# Patient Record
Sex: FEMALE | Race: BLACK OR AFRICAN AMERICAN | NOT HISPANIC OR LATINO | Employment: FULL TIME
[De-identification: names, ages, dates, MRNs, and addresses within clinical notes are randomized per-mention and may not be internally consistent; named-entity substitution may affect disease eponyms.]

---

## 2017-07-29 ENCOUNTER — HEALTH MAINTENANCE LETTER (OUTPATIENT)
Age: 42
End: 2017-07-29

## 2019-01-25 ENCOUNTER — NURSE TRIAGE (OUTPATIENT)
Dept: NURSING | Facility: CLINIC | Age: 44
End: 2019-01-25

## 2019-01-26 NOTE — TELEPHONE ENCOUNTER
Patient calling with concerns she took too much medicine. She has had a tooth infection and was seen by dentist today, was started on Amoxicillin and T#3, she has taken each a couple times today and she took ibuprofen and now for the past 30 minutes has significant chest pain and difficulty breathing, feels her heart is racing and it hurts. Sounds like she is in distress. Is home alone. Will call 911.     Reason for Disposition    Sounds like a life-threatening emergency to the triager    Protocols used: CHEST PAIN-ADULT-AH

## 2019-04-12 ENCOUNTER — OFFICE VISIT (OUTPATIENT)
Dept: URGENT CARE | Facility: URGENT CARE | Age: 44
End: 2019-04-12

## 2019-04-12 VITALS
SYSTOLIC BLOOD PRESSURE: 112 MMHG | OXYGEN SATURATION: 97 % | RESPIRATION RATE: 16 BRPM | TEMPERATURE: 98.3 F | HEART RATE: 83 BPM | BODY MASS INDEX: 37.28 KG/M2 | HEIGHT: 66 IN | WEIGHT: 232 LBS | DIASTOLIC BLOOD PRESSURE: 64 MMHG

## 2019-04-12 DIAGNOSIS — R09.A2 GLOBUS SENSATION: Primary | ICD-10-CM

## 2019-04-12 LAB
DEPRECATED S PYO AG THROAT QL EIA: NORMAL
SPECIMEN SOURCE: NORMAL

## 2019-04-12 PROCEDURE — 99213 OFFICE O/P EST LOW 20 MIN: CPT | Performed by: PHYSICIAN ASSISTANT

## 2019-04-12 PROCEDURE — 87880 STREP A ASSAY W/OPTIC: CPT | Performed by: PHYSICIAN ASSISTANT

## 2019-04-12 PROCEDURE — 87081 CULTURE SCREEN ONLY: CPT | Performed by: PHYSICIAN ASSISTANT

## 2019-04-12 ASSESSMENT — MIFFLIN-ST. JEOR: SCORE: 1734.1

## 2019-04-12 ASSESSMENT — PAIN SCALES - GENERAL: PAINLEVEL: SEVERE PAIN (6)

## 2019-04-13 LAB
BACTERIA SPEC CULT: NORMAL
SPECIMEN SOURCE: NORMAL

## 2019-04-13 NOTE — PROGRESS NOTES
CHIEF COMPLAINT:   Chief Complaint   Patient presents with     Urgent Care     Shortness of Breath     SOB, feels like there is a lump in throat x 1month that it is getting worse.  Has felt like there was a lump for about, hx of allergies causing sob.  Can feel lump on outside of neck, feels like it is in the way of breathing.       HPI: Briana Jose is a 43 year old female who presents to clinic today for evaluation of lump in throat.  Patient reports that for the past year she has had a globus sensation in the back of her throat.  She notes that over the past 2 months she thinks that the lump in her throat has worsened.  She notices it most when swallowing but feels it there all the time.  She also feels a part on her skin that she thinks is inflamed along with the bump.  She complains of year round allergies, flaring up right now with the season change. She endorses having a clogged nose, which she causing her to feel short of breath, but does not have difficulty breathing.   Denies fever/chills, HA, CP, pleuritic chest pain, cough, abd pain, N/V/D, rash, or any other symptoms.     Past Medical History:   Diagnosis Date     Vitamin D deficiency      Past Surgical History:   Procedure Laterality Date     ARTHROSCOPY KNEE RT/LT Right 2012     CHOLECYSTECTOMY, LAPOROSCOPIC  2008     Social History     Tobacco Use     Smoking status: Never Smoker     Smokeless tobacco: Never Used   Substance Use Topics     Alcohol use: Yes     Alcohol/week: 0.0 oz     Comment: socially     No current outpatient medications on file.     No current facility-administered medications for this visit.      No Known Allergies    10 point ROS of systems including Constitutional, Eyes, Respiratory, Cardiovascular, Gastroenterology, Genitourinary, Integumentary, Muscularskeletal, Psychiatric were all negative except for pertinent positives noted in my HPI.        Exam:  /64 (BP Location: Right arm, Patient Position: Sitting, Cuff  "Size: Adult Large)   Pulse 83   Temp 98.3  F (36.8  C) (Oral)   Resp 16   Ht 1.676 m (5' 6\")   Wt 105.2 kg (232 lb)   LMP  (LMP Unknown)   SpO2 97%   Breastfeeding? No   BMI 37.45 kg/m    Constitutional: healthy, alert and no distress  Head: Normocephalic, atraumatic.  Eyes: conjunctiva clear, no drainage  ENT: TMs clear and shiny leanne, nasal mucosa pink and moist, oropharynx has cobblestoning and erythema  Neck: neck is supple, no cervical lymphadenopathy or nuchal rigidity. Small area of inflammation without redness palpated at area of sternal notch.   Cardiovascular: RRR  Respiratory: CTA bilaterally, no rhonchi or rales  Gastrointestinal: soft and nontender  Skin: no rashes  Neurologic: Speech clear, gait normal. Moves all extremities.    Results for orders placed or performed in visit on 04/12/19   Strep, Rapid Screen   Result Value Ref Range    Specimen Description Throat     Rapid Strep A Screen       NEGATIVE: No Group A streptococcal antigen detected by immunoassay, await culture report.         ASSESSMENT/PLAN:  1. Globus sensation  43-year-old otherwise healthy female with a sensation of something being in her throat.  On exam mass is not noted, but she does have mild irritation at sternal notch.  Stridor is not appreciated on exam, voice sounds within normal limits and oxygen is 97%.  She does not endorse feeling like she cannot breathe.  ENT referral was given to patient for follow-up for further evaluation of globus sensation.  Encouraged using antihistamines for allergies.  Red flag symptoms were discussed.  - Strep, Rapid Screen  - Beta strep group A culture  - OTOLARYNGOLOGY REFERRAL    I have discussed the patient's diagnosis and my plan of treatment with the patient. We went over any labs or imaging. Patient is aware to come back in with worsening symptoms or if no relief despite treatment plan.  Patient verbalizes understanding. All questions were addressed and answered.   Amber" NADIRA Lozano

## 2019-04-14 ENCOUNTER — APPOINTMENT (OUTPATIENT)
Dept: GENERAL RADIOLOGY | Facility: CLINIC | Age: 44
End: 2019-04-14
Attending: EMERGENCY MEDICINE

## 2019-04-14 ENCOUNTER — HOSPITAL ENCOUNTER (EMERGENCY)
Facility: CLINIC | Age: 44
Discharge: HOME | End: 2019-04-14
Attending: EMERGENCY MEDICINE | Admitting: EMERGENCY MEDICINE

## 2019-04-14 VITALS
RESPIRATION RATE: 20 BRPM | DIASTOLIC BLOOD PRESSURE: 82 MMHG | SYSTOLIC BLOOD PRESSURE: 123 MMHG | HEART RATE: 73 BPM | OXYGEN SATURATION: 99 % | TEMPERATURE: 98.8 F

## 2019-04-14 DIAGNOSIS — J02.9 PHARYNGITIS, UNSPECIFIED ETIOLOGY: ICD-10-CM

## 2019-04-14 LAB
ANION GAP SERPL CALCULATED.3IONS-SCNC: 6 MMOL/L (ref 3–14)
B-HCG FREE SERPL-ACNC: <5 IU/L
BASOPHILS # BLD AUTO: 0 10E9/L (ref 0–0.2)
BASOPHILS NFR BLD AUTO: 0.5 %
BUN SERPL-MCNC: 12 MG/DL (ref 7–30)
CALCIUM SERPL-MCNC: 9 MG/DL (ref 8.5–10.1)
CHLORIDE SERPL-SCNC: 107 MMOL/L (ref 94–109)
CO2 SERPL-SCNC: 26 MMOL/L (ref 20–32)
CREAT SERPL-MCNC: 0.64 MG/DL (ref 0.52–1.04)
DIFFERENTIAL METHOD BLD: ABNORMAL
EOSINOPHIL # BLD AUTO: 0.1 10E9/L (ref 0–0.7)
EOSINOPHIL NFR BLD AUTO: 2.3 %
ERYTHROCYTE [DISTWIDTH] IN BLOOD BY AUTOMATED COUNT: 12.4 % (ref 10–15)
GFR SERPL CREATININE-BSD FRML MDRD: >90 ML/MIN/{1.73_M2}
GLUCOSE SERPL-MCNC: 130 MG/DL (ref 70–99)
HCT VFR BLD AUTO: 36.3 % (ref 35–47)
HGB BLD-MCNC: 11.8 G/DL (ref 11.7–15.7)
IMM GRANULOCYTES # BLD: 0 10E9/L (ref 0–0.4)
IMM GRANULOCYTES NFR BLD: 0.2 %
LYMPHOCYTES # BLD AUTO: 2.2 10E9/L (ref 0.8–5.3)
LYMPHOCYTES NFR BLD AUTO: 49.9 %
MCH RBC QN AUTO: 28.5 PG (ref 26.5–33)
MCHC RBC AUTO-ENTMCNC: 32.5 G/DL (ref 31.5–36.5)
MCV RBC AUTO: 88 FL (ref 78–100)
MONOCYTES # BLD AUTO: 0.5 10E9/L (ref 0–1.3)
MONOCYTES NFR BLD AUTO: 11.7 %
NEUTROPHILS # BLD AUTO: 1.5 10E9/L (ref 1.6–8.3)
NEUTROPHILS NFR BLD AUTO: 35.4 %
NRBC # BLD AUTO: 0 10*3/UL
NRBC BLD AUTO-RTO: 0 /100
PLATELET # BLD AUTO: 221 10E9/L (ref 150–450)
POTASSIUM SERPL-SCNC: 3.6 MMOL/L (ref 3.4–5.3)
RBC # BLD AUTO: 4.14 10E12/L (ref 3.8–5.2)
SODIUM SERPL-SCNC: 139 MMOL/L (ref 133–144)
TROPONIN I SERPL-MCNC: <0.015 UG/L (ref 0–0.04)
TSH SERPL DL<=0.005 MIU/L-ACNC: 1.1 MU/L (ref 0.4–4)
WBC # BLD AUTO: 4.4 10E9/L (ref 4–11)

## 2019-04-14 PROCEDURE — 84443 ASSAY THYROID STIM HORMONE: CPT | Performed by: EMERGENCY MEDICINE

## 2019-04-14 PROCEDURE — 94640 AIRWAY INHALATION TREATMENT: CPT

## 2019-04-14 PROCEDURE — 25000132 ZZH RX MED GY IP 250 OP 250 PS 637: Performed by: EMERGENCY MEDICINE

## 2019-04-14 PROCEDURE — 80048 BASIC METABOLIC PNL TOTAL CA: CPT | Performed by: EMERGENCY MEDICINE

## 2019-04-14 PROCEDURE — 84702 CHORIONIC GONADOTROPIN TEST: CPT

## 2019-04-14 PROCEDURE — 85025 COMPLETE CBC W/AUTO DIFF WBC: CPT | Performed by: EMERGENCY MEDICINE

## 2019-04-14 PROCEDURE — 84484 ASSAY OF TROPONIN QUANT: CPT | Performed by: EMERGENCY MEDICINE

## 2019-04-14 PROCEDURE — 99284 EMERGENCY DEPT VISIT MOD MDM: CPT | Mod: 25

## 2019-04-14 PROCEDURE — 70360 X-RAY EXAM OF NECK: CPT

## 2019-04-14 PROCEDURE — 25000125 ZZHC RX 250: Performed by: EMERGENCY MEDICINE

## 2019-04-14 PROCEDURE — 93005 ELECTROCARDIOGRAM TRACING: CPT

## 2019-04-14 RX ORDER — ALBUTEROL SULFATE 90 UG/1
2 AEROSOL, METERED RESPIRATORY (INHALATION) EVERY 6 HOURS PRN
Qty: 1 G | Refills: 0 | Status: SHIPPED | OUTPATIENT
Start: 2019-04-14

## 2019-04-14 RX ORDER — ACETAMINOPHEN 500 MG
1000 TABLET ORAL ONCE
Status: COMPLETED | OUTPATIENT
Start: 2019-04-14 | End: 2019-04-14

## 2019-04-14 RX ORDER — DEXAMETHASONE SODIUM PHOSPHATE 10 MG/ML
10 INJECTION, SOLUTION INTRAMUSCULAR; INTRAVENOUS ONCE
Status: COMPLETED | OUTPATIENT
Start: 2019-04-14 | End: 2019-04-14

## 2019-04-14 RX ORDER — IPRATROPIUM BROMIDE AND ALBUTEROL SULFATE 2.5; .5 MG/3ML; MG/3ML
3 SOLUTION RESPIRATORY (INHALATION) ONCE
Status: COMPLETED | OUTPATIENT
Start: 2019-04-14 | End: 2019-04-14

## 2019-04-14 RX ORDER — LIDOCAINE 40 MG/G
CREAM TOPICAL
Status: DISCONTINUED | OUTPATIENT
Start: 2019-04-14 | End: 2019-04-14 | Stop reason: HOSPADM

## 2019-04-14 RX ADMIN — ACETAMINOPHEN 1000 MG: 500 TABLET, FILM COATED ORAL at 18:46

## 2019-04-14 RX ADMIN — IPRATROPIUM BROMIDE AND ALBUTEROL SULFATE 3 ML: .5; 3 SOLUTION RESPIRATORY (INHALATION) at 18:46

## 2019-04-14 RX ADMIN — DEXAMETHASONE SODIUM PHOSPHATE 10 MG: 10 INJECTION, SOLUTION INTRAMUSCULAR; INTRAVENOUS at 20:43

## 2019-04-14 ASSESSMENT — ENCOUNTER SYMPTOMS
SORE THROAT: 1
SHORTNESS OF BREATH: 1
FEVER: 0

## 2019-04-14 NOTE — ED PROVIDER NOTES
"  History     Chief Complaint:  shortness of breath     HPI   Briana Jose is a 43 year old female who presents with shortness of breath and sore throat. The patient states that she had noticed a \"lump\" on the anterior side of her throat that she was able to palpate approximately 6 months ago. She notes that about a week ago, it felt like it got larger and she noticed shortness of breath and sore throat. 2 days ago, she had visited Urgent Care and had a strep test performed which was negative. She states that she was given a referral to ENT was planning on making an appointment tomorrow. Today, the patient states that she continues to feel shortness of breath and is concerned about the mass, prompting her current ED visit. Here, the patient states that her sore throat make is difficult to talk and has pain when she swallows, but she is able to do so. She additionally endorses some nasal congestion, but denies any fever. She additionally states that her allergies are present currently as well.  She does have environmental allergies. She states that this has not limited her ability to eat or drink but that she does have discomfort.     Allergies:  NKDA     Medications:    The patient is currently on no regular medications.     Past Medical History:    Vitamin D Deficiency     Past Surgical History:    Arthroscopy knee, bilateral   Cholecystectomy    Family History:    Hypertension    Social History:  Positive for alcohol use.   Negative for tobacco use.   Marital Status:  Single [1]     Review of Systems   Constitutional: Negative for fever.   HENT: Positive for congestion and sore throat.    Respiratory: Positive for shortness of breath.    All other systems reviewed and are negative.      Physical Exam   First Vitals:  BP: (!) 145/104  Pulse: 80  Temp: 98.8  F (37.1  C)  Resp: 20  SpO2: 96 %    Physical Exam    Nursing note and vitals reviewed.    Constitutional: Pleasant and well groomed. Elevated BMI.       "    HENT:    Mouth/Throat: Oropharynx is without swelling or erythema. Oral mucosa moist. slight hoarseness to voice.    Eyes: Conjunctivae are normal. No scleral icterus.    Neck: Neck supple. Possible enlarged thyroid.   Cardiovascular: Normal rate, regular rhythm and intact distal pulses.    Pulmonary/Chest: Effort normal and breath sounds normal.   Abdominal: Soft.  No distension. There is no tenderness.   Musculoskeletal:  No edema, No calf tenderness. Tenderness over suprasternal notch. No appreciable swelling/mass.  Neurological:Alert. Coordination normal.   Skin: Skin is warm and dry.   Psychiatric: Normal mood and anxious affect.        Emergency Department Course   ECG:  Indication: sore throat, shortness of breath   Time: 1847  Vent. Rate 80 bpm. WA interval 158. QRS duration 102. QT/QTc 372/429. P-R-T axis 35 20 32. Normal sinus rhythm with sinus arrhythmia. Normal ECG. Read time: 1853.      Imaging:  Radiographic findings were communicated with the patient who voiced understanding of the findings.  XR Neck soft tissue:   Negative exam. No evidence for any epiglottitis or  retropharyngeal abscess. Degenerative changes seen in the spine. As per radiology.     Laboratory:  CBC: WBC: 4.4, HGB: 11.8, PLT: 221  BMP: Glucose 130 (H) o/w WNL (Creatinine: 0.64)    ISTAT HCG: <5.0  Troponin: <0.015  TSH with free T4: 1.10    Interventions:  1846 Duoneb, 3 mL, Nebulization  1846 Tylenol, 1000 mg, PO   2043 Decadron, 10 mg, PO    Emergency Department Course:  Nursing notes and vitals reviewed. EKG was obtained, findings above.      1843  I performed an exam of the patient as documented above.     Medicine administered as documented above. Blood drawn. This was sent to the lab for further testing, results above.    The patient was sent for a neck XR while in the emergency department, findings above.     2045 I rechecked the patient and discussed the results of her workup thus far.     Findings and plan explained to  the Patient. Patient discharged home with instructions regarding supportive care, medications, and reasons to return. The importance of close follow-up was reviewed. The patient was prescribed Albuterol inhaler.     I personally reviewed the laboratory results with the Patient and answered all related questions prior to discharge.        Impression & Plan      Medical Decision Making:  Briana Jose is a 43 year old female who presents with 6 months of feeling like there is a lump in her throat that has been worse over there last month and recently describes some rhinorrhea and a sore throat that is more painful with eating and talking. She has been able to eat and talk and has been eating solids and liquids without difficulty. She presented today with shortness of breath and sensation as described above. She was seen 2 days ago at an Urgent Care and referred to the ENT and has not called to make this appointment yet. Differential diagnosis included but not limited to pharyngitis, less likely epiglottitis, enlarged thyroid, lymphadenopathy, less likely deep space abscess or mass. ED evaluation is as noted above. She has no signs of respiratory distress. Able to lay supine without difficulty and tolerating PO. I did trial a neb without significant changes in her symptoms. I did obtain a soft tissue lateral which was read as negative for epiglottitis or prevertebral swelling. At this point, I feel that symptoms may be related to may be related to viral or allergic pharyngitis. We will trial a dose of dexamethasone, but have encouraged her to follow up with ENT as recommended as she may benefit from pharyngoscopy as well as close follow up with her PCP if she feels that her symptoms persist. At this point with reasonable clinical certainty I feel that she is safe for discharge to home with management and treatment as an outpatient. She understands to return for fever, unable to take PO, persistent shortness of  breath     Diagnosis:    ICD-10-CM   1. Pharyngitis, unspecified etiology J02.9       Disposition:  discharged to home    Discharge Medications:   Details   albuterol (PROAIR HFA/PROVENTIL HFA/VENTOLIN HFA) 108 (90 Base) MCG/ACT inhaler Inhale 2 puffs into the lungs every 6 hours as needed for shortness of breath / dyspnea or wheezing, Disp-1 g, R-0, Local Print       I, María Blackwell, am serving as a scribe on 4/14/2019 at 6:43 PM to personally document services performed by Estephania Herrera* based on my observations and the provider's statements to me.      María Blackwell  4/14/2019   [facility] EMERGENCY DEPARTMENT       Estephania Herrera MD  04/16/19 [id number]

## 2019-04-14 NOTE — ED AVS SNAPSHOT
[facility] Emergency Department  [address]  [facility] [address]  Phone:  [phone]  Fax:  [phone]                                    Briana Jose   MRN: [id number]    Department:  [facility] Emergency Department   Date of Visit:  4/14/2019           After Visit Summary Signature Page    I have received my discharge instructions, and my questions have been answered. I have discussed any challenges I see with this plan with the nurse or doctor.    ..........................................................................................................................................  Patient/Patient Representative Signature      ..........................................................................................................................................  Patient Representative Print Name and Relationship to Patient    ..................................................               ................................................  Date                                   Time    ..........................................................................................................................................  Reviewed by Signature/Title    ...................................................              ..............................................  Date                                               Time          22EPIC Rev 08/18

## 2019-04-15 LAB — INTERPRETATION ECG - MUSE: NORMAL

## 2019-11-04 ENCOUNTER — HEALTH MAINTENANCE LETTER (OUTPATIENT)
Age: 44
End: 2019-11-04

## 2020-01-17 ENCOUNTER — HOSPITAL ENCOUNTER (EMERGENCY)
Facility: CLINIC | Age: 45
Discharge: HOME | End: 2020-01-17
Attending: PHYSICIAN ASSISTANT | Admitting: PHYSICIAN ASSISTANT

## 2020-01-17 ENCOUNTER — APPOINTMENT (OUTPATIENT)
Dept: GENERAL RADIOLOGY | Facility: CLINIC | Age: 45
End: 2020-01-17
Attending: PHYSICIAN ASSISTANT

## 2020-01-17 VITALS
DIASTOLIC BLOOD PRESSURE: 81 MMHG | TEMPERATURE: 97.7 F | SYSTOLIC BLOOD PRESSURE: 124 MMHG | OXYGEN SATURATION: 97 % | HEART RATE: 87 BPM | RESPIRATION RATE: 18 BRPM

## 2020-01-17 DIAGNOSIS — M25.552 ACUTE HIP PAIN, LEFT: ICD-10-CM

## 2020-01-17 PROCEDURE — 73502 X-RAY EXAM HIP UNI 2-3 VIEWS: CPT

## 2020-01-17 PROCEDURE — 99283 EMERGENCY DEPT VISIT LOW MDM: CPT

## 2020-01-17 RX ORDER — GABAPENTIN 100 MG/1
100 CAPSULE ORAL 3 TIMES DAILY
Qty: 21 CAPSULE | Refills: 0 | Status: SHIPPED | OUTPATIENT
Start: 2020-01-17 | End: 2020-01-24

## 2020-01-17 RX ORDER — METRONIDAZOLE 500 MG/1
500 TABLET ORAL
COMMUNITY
Start: 2020-01-15 | End: 2020-01-22

## 2020-01-17 ASSESSMENT — ENCOUNTER SYMPTOMS
FEVER: 0
DIFFICULTY URINATING: 0
NUMBNESS: 0
FREQUENCY: 0
ARTHRALGIAS: 1
DYSURIA: 0
HEMATURIA: 0

## 2020-01-17 NOTE — ED TRIAGE NOTES
Patient presents with left hip pain that started suddenly yesterday. No injury noted. ABCDs intact, alert and oriented x 4.

## 2020-01-17 NOTE — ED PROVIDER NOTES
"History     Chief Complaint:  Hip Pain    HPI  Briana Jose is a 44 year old female who presents today with hip pain. The patient states 2 days ago she felt a sudden lower left back pinch that resolved immediately.Yesterday, she started to have left him pain that felt \"hot\" or \"burning\". She states her pain is exacerbated with laying on her left side, sitting or left hip flexion. She states her right side is starting to hurt as well. She has been able to ambulate, but with pain. She states she has been alternating tylenol and ibuprofen. She states knee surgery in 2011. She denies a fall, injury, rash, fever, leg swelling, hematuria, dysuria, urinary frequency, extremity numbness, stool or urinary incontinence.     Allergies:  No Known Allergies    Medications:    Albuterol  Mirena  Flagyl    Past Medical History:    Anemia    Past Surgical History:    Cholecystectomy  Knee arthroscopy    Family History:    Father - hypertension  Father - hypertension    Social History:  The patient was accompanied to the ED alone.  Smoking Status: Never  Smokeless Tobacco: Never  Alcohol Use: Yes   Drug Use: No   PCP: Ottoniel Orozco   Marital Status: Single     Review of Systems   Constitutional: Negative for fever.   Cardiovascular: Negative for leg swelling.   Genitourinary: Negative for difficulty urinating, dysuria, frequency and hematuria.        Denies incontinence   Musculoskeletal: Positive for arthralgias.   Skin: Negative for rash.   Neurological: Negative for numbness.   All other systems reviewed and are negative.     Physical Exam     Patient Vitals for the past 24 hrs:   BP Temp Temp src Heart Rate Resp SpO2   01/17/20 1133 (!) 129/111 97.7  F (36.5  C) Oral 88 18 96 %       Physical Exam  Constitutional:       General: She is not in acute distress.     Appearance: She is not diaphoretic.   HENT:      Head: Atraumatic.      Mouth/Throat:      Pharynx: No oropharyngeal exudate.   Eyes:      General: No " scleral icterus.     Pupils: Pupils are equal, round, and reactive to light.   Cardiovascular:      Rate and Rhythm: Normal rate and regular rhythm.      Pulses: Normal pulses.   Pulmonary:      Effort: No respiratory distress.      Breath sounds: Normal breath sounds.   Abdominal:      General: Bowel sounds are normal.      Palpations: Abdomen is soft.      Tenderness: There is no abdominal tenderness.   Musculoskeletal:         General: No tenderness.      Comments: Baseline ROM of the L hip, knee, ankle, foot. There is tenderness and pain of the lateral aspect L hip. No erythema, induration, rash, fluctuance, petechiae noted of the area. Neurovascularly intact to the distal extremity. Pain worsened with flexion of the L hip otherwise unremarkable.   Skin:     General: Skin is warm.      Findings: No rash.       Emergency Department Course     Imaging:  Radiology results were communicated with the patient who voiced understanding of the findings.    XR Pelvis and Hip, left, 1 view:   IMPRESSION: Hip joint spaces are well-preserved. No evidence of  fracture or AVN, as per radiology.    Emergency Department Course:  Nursing notes and vitals reviewed. (11:32 AM) I performed an exam of the patient as documented above.     The patient was sent for XR while in the emergency department, results above.     1208 I rechecked the patient and discussed the results of their workup thus far.     1300 I rechecked and updated the patient.    Findings and plan explained to the Patient. Patient discharged home with instructions regarding supportive care, medications, and reasons to return. The importance of close follow-up was reviewed. The patient was prescribed as below.     Impression & Plan      Medical Decision Making:  She presents for evaluation of L hip pain. She appears hemodynamically stable and does not clinically appear most c/w aortic dissection or expanding aneurysm. Denies symptoms of UTI or kidney stone. She is  without back pain, denies symptoms of cauda equina syndrome, spinal epidural hematoma. She is without risk factors and does not serem most c/w spinal epidural abscess. Her pain is best described as neuropathic in nature. Her primary concern is osteoarthritis of the hip. Plan film imaging obtained without findings of fracture, dislocation, effusion, or osteoarthritis. Outpatient symptomatic care. No findings of bursitis, cellulitis, abscess, or herpes zoster on examination       Diagnosis:    ICD-10-CM    1. Acute hip pain, left M25.552      Disposition:  Discharged to home.    Discharge Medications:  New Prescriptions    GABAPENTIN (NEURONTIN) 100 MG CAPSULE    Take 1 capsule (100 mg) by mouth 3 times daily for 7 days     Scribe Disclosure:  ERIKA, Nasim Rankin, am serving as a scribe at 11:32 AM on 1/17/2020 to document services personally performed by Garcia Coker based on my observations and the provider's statements to me.      1/17/2020   [facility] EMERGENCY DEPARTMENT       Garcia Coker PA-C  01/17/20 [id number]

## 2020-11-22 ENCOUNTER — HEALTH MAINTENANCE LETTER (OUTPATIENT)
Age: 45
End: 2020-11-22

## 2021-09-18 ENCOUNTER — HEALTH MAINTENANCE LETTER (OUTPATIENT)
Age: 46
End: 2021-09-18

## 2022-01-08 ENCOUNTER — HEALTH MAINTENANCE LETTER (OUTPATIENT)
Age: 47
End: 2022-01-08

## 2022-04-30 ENCOUNTER — HEALTH MAINTENANCE LETTER (OUTPATIENT)
Age: 47
End: 2022-04-30

## 2022-05-28 ENCOUNTER — OFFICE VISIT (OUTPATIENT)
Dept: URGENT CARE | Facility: URGENT CARE | Age: 47
End: 2022-05-28

## 2022-05-28 VITALS
RESPIRATION RATE: 20 BRPM | HEART RATE: 71 BPM | OXYGEN SATURATION: 98 % | TEMPERATURE: 99.1 F | DIASTOLIC BLOOD PRESSURE: 72 MMHG | SYSTOLIC BLOOD PRESSURE: 114 MMHG

## 2022-05-28 DIAGNOSIS — R53.82 CHRONIC FATIGUE: Primary | ICD-10-CM

## 2022-05-28 DIAGNOSIS — R52 BODY ACHES: ICD-10-CM

## 2022-05-28 LAB
BASOPHILS # BLD AUTO: 0 10E3/UL (ref 0–0.2)
BASOPHILS NFR BLD AUTO: 0 %
EOSINOPHIL # BLD AUTO: 0.2 10E3/UL (ref 0–0.7)
EOSINOPHIL NFR BLD AUTO: 3 %
ERYTHROCYTE [DISTWIDTH] IN BLOOD BY AUTOMATED COUNT: 12.5 % (ref 10–15)
HCT VFR BLD AUTO: 35.7 % (ref 35–47)
HGB BLD-MCNC: 11.9 G/DL (ref 11.7–15.7)
LYMPHOCYTES # BLD AUTO: 2.4 10E3/UL (ref 0.8–5.3)
LYMPHOCYTES NFR BLD AUTO: 33 %
MCH RBC QN AUTO: 29.2 PG (ref 26.5–33)
MCHC RBC AUTO-ENTMCNC: 33.3 G/DL (ref 31.5–36.5)
MCV RBC AUTO: 88 FL (ref 78–100)
MONOCYTES # BLD AUTO: 0.5 10E3/UL (ref 0–1.3)
MONOCYTES NFR BLD AUTO: 7 %
NEUTROPHILS # BLD AUTO: 4.1 10E3/UL (ref 1.6–8.3)
NEUTROPHILS NFR BLD AUTO: 56 %
PLATELET # BLD AUTO: 240 10E3/UL (ref 150–450)
RBC # BLD AUTO: 4.08 10E6/UL (ref 3.8–5.2)
WBC # BLD AUTO: 7.3 10E3/UL (ref 4–11)

## 2022-05-28 PROCEDURE — 99213 OFFICE O/P EST LOW 20 MIN: CPT | Performed by: NURSE PRACTITIONER

## 2022-05-28 PROCEDURE — 36415 COLL VENOUS BLD VENIPUNCTURE: CPT | Performed by: NURSE PRACTITIONER

## 2022-05-28 PROCEDURE — 85025 COMPLETE CBC W/AUTO DIFF WBC: CPT | Performed by: NURSE PRACTITIONER

## 2022-05-28 PROCEDURE — 80053 COMPREHEN METABOLIC PANEL: CPT | Performed by: NURSE PRACTITIONER

## 2022-05-28 RX ORDER — COVID-19 ANTIGEN TEST
220 KIT MISCELLANEOUS 2 TIMES DAILY WITH MEALS
COMMUNITY

## 2022-05-28 NOTE — PROGRESS NOTES
Assessment & Plan     Chronic fatigue  - CBC with platelets and differential  - Comprehensive metabolic panel (BMP + Alb, Alk Phos, ALT, AST, Total. Bili, TP)  - CBC with platelets and differential  - Comprehensive metabolic panel (BMP + Alb, Alk Phos, ALT, AST, Total. Bili, TP)    Body aches  - CBC with platelets and differential  - Comprehensive metabolic panel (BMP + Alb, Alk Phos, ALT, AST, Total. Bili, TP)  - CBC with platelets and differential  - Comprehensive metabolic panel (BMP + Alb, Alk Phos, ALT, AST, Total. Bili, TP)    Discussed normal exam  CBC normal, CMP pending.  Will r/o infection or bleeding or electrolyte imbalance as a cause for fatigue and malaise.    Will f/u with PCP if persists or worsens.    Return in about 2 days (around 5/30/2022) for with regular provider if symptoms persist.    MISHA Koch [facility] [place] is a 47 year old female who presents to clinic today for the following health issues:  Chief Complaint   Patient presents with     Urgent Care     Pain     Body sore-Patient says she was unable to turn key in house door-Hx of arthritis      HPI    MS Injury/Pain    Onset of symptoms was several  week(s) ago.  Location: whole body  Context:       No specific injury or trauma.  Roving miscellaneous joints and limbs hurt.    Course of symptoms is waxing and waning.    Severity mild  Current and Associated symptoms: Tenderness, Decreased range of motion and Stiffness  Denies  Pain, Swelling, Bruising, Warmth and Redness  Aggravating Factors: none  Therapies to improve symptoms include: ice and rest  This is the first time this type of problem has occurred for this patient.     Review of Systems  Constitutional, HEENT, cardiovascular, pulmonary, GI, , musculoskeletal, neuro, skin, endocrine and psych systems are negative, except as otherwise noted.      Objective    /72   Pulse 71   Temp 99.1  F (37.3  C)  (Tympanic)   Resp 20   LMP 05/04/2022   SpO2 98%   Breastfeeding No   Physical Exam   GENERAL: healthy, alert and no distress  EYES: Eyes grossly normal to inspection, PERRL and conjunctivae and sclerae normal  NECK: no adenopathy, no asymmetry, masses, or scars and thyroid normal to palpation  RESP: lungs clear to auscultation - no rales, rhonchi or wheezes  CV: regular rate and rhythm, normal S1 S2, no S3 or S4, no murmur, click or rub, no peripheral edema and peripheral pulses strong  ABDOMEN: soft, nontender, no hepatosplenomegaly, no masses and bowel sounds normal  MS: no gross musculoskeletal defects noted, no edema  SKIN: no suspicious lesions or rashes  NEURO: Normal strength and tone, mentation intact and speech normal    Results for orders placed or performed in visit on 05/28/22   CBC with platelets and differential     Status: None   Result Value Ref Range    WBC Count 7.3 4.0 - 11.0 10e3/uL    RBC Count 4.08 3.80 - 5.20 10e6/uL    Hemoglobin 11.9 11.7 - 15.7 g/dL    Hematocrit 35.7 35.0 - 47.0 %    MCV 88 78 - 100 fL    MCH 29.2 26.5 - 33.0 pg    MCHC 33.3 31.5 - 36.5 g/dL    RDW 12.5 10.0 - 15.0 %    Platelet Count 240 150 - 450 10e3/uL    % Neutrophils 56 %    % Lymphocytes 33 %    % Monocytes 7 %    % Eosinophils 3 %    % Basophils 0 %    Absolute Neutrophils 4.1 1.6 - 8.3 10e3/uL    Absolute Lymphocytes 2.4 0.8 - 5.3 10e3/uL    Absolute Monocytes 0.5 0.0 - 1.3 10e3/uL    Absolute Eosinophils 0.2 0.0 - 0.7 10e3/uL    Absolute Basophils 0.0 0.0 - 0.2 10e3/uL   CBC with platelets and differential     Status: None    Narrative    The following orders were created for panel order CBC with platelets and differential.  Procedure                               Abnormality         Status                     ---------                               -----------         ------                     CBC with platelets and d...[[id number]]                      Final result                 Please view results for  these tests on the individual orders.

## 2022-05-29 LAB
ALBUMIN SERPL-MCNC: 3.3 G/DL (ref 3.4–5)
ALP SERPL-CCNC: 37 U/L (ref 40–150)
ALT SERPL W P-5'-P-CCNC: 27 U/L (ref 0–50)
ANION GAP SERPL CALCULATED.3IONS-SCNC: 3 MMOL/L (ref 3–14)
AST SERPL W P-5'-P-CCNC: 11 U/L (ref 0–45)
BILIRUB SERPL-MCNC: 0.2 MG/DL (ref 0.2–1.3)
BUN SERPL-MCNC: 10 MG/DL (ref 7–30)
CALCIUM SERPL-MCNC: 8.8 MG/DL (ref 8.5–10.1)
CHLORIDE BLD-SCNC: 109 MMOL/L (ref 94–109)
CO2 SERPL-SCNC: 26 MMOL/L (ref 20–32)
CREAT SERPL-MCNC: 0.67 MG/DL (ref 0.52–1.04)
GFR SERPL CREATININE-BSD FRML MDRD: >90 ML/MIN/1.73M2
GLUCOSE BLD-MCNC: 103 MG/DL (ref 70–99)
POTASSIUM BLD-SCNC: 4.2 MMOL/L (ref 3.4–5.3)
PROT SERPL-MCNC: 6.7 G/DL (ref 6.8–8.8)
SODIUM SERPL-SCNC: 138 MMOL/L (ref 133–144)

## 2022-06-26 ENCOUNTER — HOSPITAL ENCOUNTER (EMERGENCY)
Facility: CLINIC | Age: 47
Discharge: HOME | End: 2022-06-26
Attending: EMERGENCY MEDICINE | Admitting: EMERGENCY MEDICINE

## 2022-06-26 VITALS
OXYGEN SATURATION: 98 % | DIASTOLIC BLOOD PRESSURE: 71 MMHG | SYSTOLIC BLOOD PRESSURE: 119 MMHG | RESPIRATION RATE: 18 BRPM | HEART RATE: 64 BPM | TEMPERATURE: 97.5 F

## 2022-06-26 DIAGNOSIS — M25.50 ARTHRALGIA, UNSPECIFIED JOINT: ICD-10-CM

## 2022-06-26 LAB
ALBUMIN SERPL-MCNC: 3 G/DL (ref 3.4–5)
ALP SERPL-CCNC: 41 U/L (ref 40–150)
ALT SERPL W P-5'-P-CCNC: 32 U/L (ref 0–50)
ANION GAP SERPL CALCULATED.3IONS-SCNC: 5 MMOL/L (ref 3–14)
AST SERPL W P-5'-P-CCNC: <3 U/L (ref 0–45)
B BURGDOR IGG+IGM SER QL: 0.25
BASOPHILS # BLD AUTO: 0 10E3/UL (ref 0–0.2)
BASOPHILS NFR BLD AUTO: 1 %
BILIRUB SERPL-MCNC: 0.4 MG/DL (ref 0.2–1.3)
BUN SERPL-MCNC: 12 MG/DL (ref 7–30)
CALCIUM SERPL-MCNC: 8.5 MG/DL (ref 8.5–10.1)
CHLORIDE BLD-SCNC: 107 MMOL/L (ref 94–109)
CO2 SERPL-SCNC: 24 MMOL/L (ref 20–32)
CREAT SERPL-MCNC: 0.65 MG/DL (ref 0.52–1.04)
CRP SERPL-MCNC: 15.5 MG/L (ref 0–8)
EOSINOPHIL # BLD AUTO: 0.3 10E3/UL (ref 0–0.7)
EOSINOPHIL NFR BLD AUTO: 4 %
ERYTHROCYTE [DISTWIDTH] IN BLOOD BY AUTOMATED COUNT: 12.7 % (ref 10–15)
GFR SERPL CREATININE-BSD FRML MDRD: >90 ML/MIN/1.73M2
GLUCOSE BLD-MCNC: 103 MG/DL (ref 70–99)
HCT VFR BLD AUTO: 34.6 % (ref 35–47)
HGB BLD-MCNC: 11.1 G/DL (ref 11.7–15.7)
HOLD SPECIMEN: NORMAL
IMM GRANULOCYTES # BLD: 0 10E3/UL
IMM GRANULOCYTES NFR BLD: 0 %
LYMPHOCYTES # BLD AUTO: 2.5 10E3/UL (ref 0.8–5.3)
LYMPHOCYTES NFR BLD AUTO: 32 %
MCH RBC QN AUTO: 28.5 PG (ref 26.5–33)
MCHC RBC AUTO-ENTMCNC: 32.1 G/DL (ref 31.5–36.5)
MCV RBC AUTO: 89 FL (ref 78–100)
MONOCYTES # BLD AUTO: 0.6 10E3/UL (ref 0–1.3)
MONOCYTES NFR BLD AUTO: 8 %
NEUTROPHILS # BLD AUTO: 4.3 10E3/UL (ref 1.6–8.3)
NEUTROPHILS NFR BLD AUTO: 55 %
NRBC # BLD AUTO: 0 10E3/UL
NRBC BLD AUTO-RTO: 0 /100
PLATELET # BLD AUTO: 214 10E3/UL (ref 150–450)
POTASSIUM BLD-SCNC: 3.5 MMOL/L (ref 3.4–5.3)
PROT SERPL-MCNC: 7.6 G/DL (ref 6.8–8.8)
RBC # BLD AUTO: 3.9 10E6/UL (ref 3.8–5.2)
SODIUM SERPL-SCNC: 136 MMOL/L (ref 133–144)
URATE SERPL-MCNC: 3.8 MG/DL (ref 2.6–6)
WBC # BLD AUTO: 7.8 10E3/UL (ref 4–11)

## 2022-06-26 PROCEDURE — 99284 EMERGENCY DEPT VISIT MOD MDM: CPT | Performed by: EMERGENCY MEDICINE

## 2022-06-26 PROCEDURE — 86140 C-REACTIVE PROTEIN: CPT | Performed by: EMERGENCY MEDICINE

## 2022-06-26 PROCEDURE — 86038 ANTINUCLEAR ANTIBODIES: CPT | Performed by: EMERGENCY MEDICINE

## 2022-06-26 PROCEDURE — 36415 COLL VENOUS BLD VENIPUNCTURE: CPT | Performed by: EMERGENCY MEDICINE

## 2022-06-26 PROCEDURE — 80053 COMPREHEN METABOLIC PANEL: CPT | Performed by: EMERGENCY MEDICINE

## 2022-06-26 PROCEDURE — 85025 COMPLETE CBC W/AUTO DIFF WBC: CPT | Performed by: EMERGENCY MEDICINE

## 2022-06-26 PROCEDURE — 250N000013 HC RX MED GY IP 250 OP 250 PS 637: Performed by: EMERGENCY MEDICINE

## 2022-06-26 PROCEDURE — 86060 ANTISTREPTOLYSIN O TITER: CPT | Performed by: EMERGENCY MEDICINE

## 2022-06-26 PROCEDURE — 86618 LYME DISEASE ANTIBODY: CPT | Performed by: EMERGENCY MEDICINE

## 2022-06-26 PROCEDURE — 84550 ASSAY OF BLOOD/URIC ACID: CPT | Performed by: EMERGENCY MEDICINE

## 2022-06-26 PROCEDURE — 86431 RHEUMATOID FACTOR QUANT: CPT | Performed by: EMERGENCY MEDICINE

## 2022-06-26 RX ORDER — TIZANIDINE 2 MG/1
2 TABLET ORAL ONCE
Status: COMPLETED | OUTPATIENT
Start: 2022-06-26 | End: 2022-06-26

## 2022-06-26 RX ORDER — NAPROXEN 500 MG/1
500 TABLET ORAL 2 TIMES DAILY WITH MEALS
Qty: 16 TABLET | Refills: 0 | Status: SHIPPED | OUTPATIENT
Start: 2022-06-26 | End: 2022-07-04

## 2022-06-26 RX ORDER — NAPROXEN 250 MG/1
500 TABLET ORAL ONCE
Status: COMPLETED | OUTPATIENT
Start: 2022-06-26 | End: 2022-06-26

## 2022-06-26 RX ORDER — TIZANIDINE 2 MG/1
2 TABLET ORAL 3 TIMES DAILY
Qty: 15 TABLET | Refills: 0 | Status: SHIPPED | OUTPATIENT
Start: 2022-06-26

## 2022-06-26 RX ADMIN — TIZANIDINE 2 MG: 2 TABLET ORAL at 06:52

## 2022-06-26 RX ADMIN — NAPROXEN 500 MG: 250 TABLET ORAL at 06:52

## 2022-06-26 NOTE — DISCHARGE INSTRUCTIONS
The cause of your symptoms at this point are unclear.  Your lab work for infection and electrolyte imbalance are still normal.  The emergency room does not have BUT we did send an arthritis panel.  Please follow-up with your regular doctor for reassessment of this result.  Return with fever greater than 100.4 rash or other concerns.

## 2022-06-26 NOTE — ED TRIAGE NOTES
Pt reports 1 month of body aches and fatigue. Today the pain is the worst in her neck and she has difficulty with movement.

## 2022-06-27 LAB
ANA PAT SER IF-IMP: ABNORMAL
ANA SER QL IF: ABNORMAL
ANA TITR SER IF: ABNORMAL {TITER}
ASO AB SERPL-ACNC: <55 IU/ML
RHEUMATOID FACT SER NEPH-ACNC: <6 IU/ML

## 2022-07-04 NOTE — ED PROVIDER NOTES
History     Chief Complaint:    Generalized Body Aches      DENI Jose is a 47 year old female who presents with multiple complaints    Patient is a 47-year-old female who saw her primary doctor at the end of May for body aches and not feeling well.  Patient continues to not feel well.  Patient describes neck pain joint pains body aches no clear fever no tick bites no vomiting or diarrhea.  Presents by private car for further assessment.    Review of Systems  Myalgias arthralgias neck pain negative for headache negative for light sensitivity all the systems negative except as above    Allergies:    No Known Allergies      Medications:      naproxen (NAPROSYN) 500 MG tablet  tiZANidine (ZANAFLEX) 2 MG tablet  albuterol (PROAIR HFA/PROVENTIL HFA/VENTOLIN HFA) 108 (90 Base) MCG/ACT inhaler  gabapentin (NEURONTIN) 100 MG capsule  naproxen sodium 220 MG capsule        Past Medical History:      Past Medical History:   Diagnosis Date     Vitamin D deficiency      Patient Active Problem List    Diagnosis Date Noted     Chronic fatigue 03/07/2016     Priority: Medium     Vitamin D deficiency 03/07/2016     Priority: Medium     LAST LEVEL LEVEL 9.3 MCG/L       History of anemia 03/07/2016     Priority: Medium     Acne vulgaris 03/07/2016     Priority: Medium     CARDIOVASCULAR SCREENING; LDL GOAL LESS THAN 160 03/07/2016     Priority: Medium     FH: breast cancer in relative when <45 years old 03/07/2016     Priority: Medium        Past Surgical History:    \  Past Surgical History:   Procedure Laterality Date     ARTHROSCOPY KNEE RT/LT Right 2012     CHOLECYSTECTOMY, LAPOROSCOPIC  2008       Family History:    \  Family History   Problem Relation Age of Onset     Breast Cancer Maternal Aunt      Breast Cancer Maternal Aunt      Breast Cancer Maternal Grandmother      Breast Cancer Paternal Aunt      Hypertension Maternal Grandfather      Diabetes Type 2  Paternal Uncle      Hypertension Maternal Aunt       Hypertension Father      Hypertension Paternal Aunt         X 3     Hypertension Maternal Aunt         X 2     Diabetes Type 2  Paternal Aunt         X 1 ALSO MULTIPLE COUSINS     Neuromuscular Disease Maternal Aunt         MULTIPLE SCLEROSIS       Social History:    [facility]  The patient presents to the ED with alone    Physical Exam     No data found.    Physical Exam  HENT:      Head: Normocephalic.      Right Ear: Tympanic membrane normal.      Left Ear: Tympanic membrane normal.      Mouth/Throat:      Mouth: Mucous membranes are moist.   Eyes:      Pupils: Pupils are equal, round, and reactive to light.   Cardiovascular:      Rate and Rhythm: Normal rate and regular rhythm.   Pulmonary:      Effort: Pulmonary effort is normal.      Breath sounds: Normal breath sounds.   Abdominal:      General: Abdomen is flat.      Palpations: Abdomen is soft.   Musculoskeletal:      Cervical back: Normal range of motion. Tenderness present.      Comments: Tender with palpation diffusely.  No erythema no joint swelling.   Skin:     General: Skin is warm.      Capillary Refill: Capillary refill takes less than 2 seconds.   Neurological:      General: No focal deficit present.      Mental Status: She is alert.         Emergency Department Course         Laboratory:  Labs Ordered and Resulted from Time of ED Arrival to Time of ED Departure   COMPREHENSIVE METABOLIC PANEL - Abnormal       Result Value    Sodium 136      Potassium 3.5      Chloride 107      Carbon Dioxide (CO2) 24      Anion Gap 5      Urea Nitrogen 12      Creatinine 0.65      Calcium 8.5      Glucose 103 (*)     Alkaline Phosphatase 41      AST <3      ALT 32      Protein Total 7.6      Albumin 3.0 (*)     Bilirubin Total 0.4      GFR Estimate >90     CRP INFLAMMATION - Abnormal    CRP Inflammation 15.5 (*)    CBC WITH PLATELETS AND DIFFERENTIAL - Abnormal    WBC Count 7.8      RBC Count 3.90      Hemoglobin 11.1 (*)     Hematocrit 34.6 (*)      MCV 89      MCH 28.5      MCHC 32.1      RDW 12.7      Platelet Count 214      % Neutrophils 55      % Lymphocytes 32      % Monocytes 8      % Eosinophils 4      % Basophils 1      % Immature Granulocytes 0      NRBCs per 100 WBC 0      Absolute Neutrophils 4.3      Absolute Lymphocytes 2.5      Absolute Monocytes 0.6      Absolute Eosinophils 0.3      Absolute Basophils 0.0      Absolute Immature Granulocytes 0.0      Absolute NRBCs 0.0     URIC ACID - Normal    Uric Acid 3.8         Emergency Department Course:             Reviewed:    I reviewed nursing notes and vitals    Assessments:  07 I obtained history and examined the patient as noted above.   0800 I rechecked the patient and explained findings.       Consults:            Interventions:    Medications   naproxen (NAPROSYN) tablet 500 mg (500 mg Oral Given 6/26/22 0652)   tiZANidine (ZANAFLEX) tablet 2 mg (2 mg Oral Given 6/26/22 0652)       Disposition:  The patient was discharged to home.     Impression & Plan        Medical Decision Making:  Patient presents the emergency with multiple multiplicity of complaints most mostly about myalgia and arthralgia.  Patient is well-appearing without clear signs of arthritis.  Patient is diffuse tenderness.  Wonder about fibromyalgia or chronic fatigue syndrome.  This was in her diagnostic list and differential diagnosis at her primary care's visit almost 2 weeks ago.  Patient was advised that the emergency room cannot answer pain and discomfort has been going on for months we will offer NSAIDs and muscle relaxant arthritic panel was sent and follow-up with primary care is highly recommended.  Patient was discharged in stable condition      Covid-19  Briana Jose was evaluated during a global COVID-19 pandemic, which necessitated consideration that the patient might be at risk for infection with the SARS-CoV-2 virus that causes COVID-19.   Applicable protocols for evaluation were followed during the patient's  care.   COVID-19 was considered as part of the patient's evaluation.    Diagnosis:    ICD-10-CM    1. Arthralgia, unspecified joint  M25.50        Discharge Medications:  Discharge Medication List as of 6/26/2022  7:51 AM      START taking these medications    Details   naproxen (NAPROSYN) 500 MG tablet Take 1 tablet (500 mg) by mouth 2 times daily (with meals) for 8 days, Disp-16 tablet, R-0, Local Print      tiZANidine (ZANAFLEX) 2 MG tablet Take 1 tablet (2 mg) by mouth 3 times daily, Disp-15 tablet, R-0, Local Print               Scribe Disclosure:  I, Ladarius Phelps MD, am serving as a scribe at 6:09 PM on 7/4/2022 to document services personally performed by No att. providers found based on my observations and the provider's statements to me.      Ladarius Phelps MD  07/04/22 [id number]

## 2022-11-20 ENCOUNTER — HEALTH MAINTENANCE LETTER (OUTPATIENT)
Age: 47
End: 2022-11-20

## 2023-06-02 ENCOUNTER — HEALTH MAINTENANCE LETTER (OUTPATIENT)
Age: 48
End: 2023-06-02

## 2023-11-25 ENCOUNTER — HEALTH MAINTENANCE LETTER (OUTPATIENT)
Age: 48
End: 2023-11-25

## 2025-01-04 ENCOUNTER — HEALTH MAINTENANCE LETTER (OUTPATIENT)
Age: 50
End: 2025-01-04

## 2025-06-21 ENCOUNTER — HEALTH MAINTENANCE LETTER (OUTPATIENT)
Age: 50
End: 2025-06-21